# Patient Record
Sex: FEMALE | Race: WHITE | NOT HISPANIC OR LATINO | URBAN - METROPOLITAN AREA
[De-identification: names, ages, dates, MRNs, and addresses within clinical notes are randomized per-mention and may not be internally consistent; named-entity substitution may affect disease eponyms.]

---

## 2020-09-04 ENCOUNTER — APPOINTMENT (RX ONLY)
Dept: URBAN - METROPOLITAN AREA CLINIC 216 | Facility: CLINIC | Age: 50
Setting detail: DERMATOLOGY
End: 2020-09-04

## 2020-09-04 DIAGNOSIS — Z41.9 ENCOUNTER FOR PROCEDURE FOR PURPOSES OTHER THAN REMEDYING HEALTH STATE, UNSPECIFIED: ICD-10-CM

## 2020-09-04 PROCEDURE — ? BOTOX

## 2020-09-04 NOTE — PROCEDURE: BOTOX
Price (Use Numbers Only, No Special Characters Or $): 923 Price (Use Numbers Only, No Special Characters Or $): 845

## 2022-07-20 ENCOUNTER — APPOINTMENT (RX ONLY)
Dept: URBAN - METROPOLITAN AREA CLINIC 236 | Facility: CLINIC | Age: 52
Setting detail: DERMATOLOGY
End: 2022-07-20

## 2022-07-20 DIAGNOSIS — L72.8 OTHER FOLLICULAR CYSTS OF THE SKIN AND SUBCUTANEOUS TISSUE: ICD-10-CM | Status: INADEQUATELY CONTROLLED

## 2022-07-20 PROCEDURE — 99212 OFFICE O/P EST SF 10 MIN: CPT

## 2022-07-20 PROCEDURE — ? COUNSELING

## 2022-07-20 PROCEDURE — ? PRESCRIPTION MEDICATION MANAGEMENT

## 2022-07-20 PROCEDURE — ? PRESCRIPTION

## 2022-07-20 RX ORDER — DOXYCYCLINE HYCLATE 100 MG/1
CAPSULE, GELATIN COATED ORAL BID
Qty: 20 | Refills: 0 | Status: ERX | COMMUNITY
Start: 2022-07-20

## 2022-07-20 RX ADMIN — DOXYCYCLINE HYCLATE: 100 CAPSULE, GELATIN COATED ORAL at 00:00

## 2022-07-20 ASSESSMENT — LOCATION ZONE DERM: LOCATION ZONE: LEG

## 2022-07-20 ASSESSMENT — LOCATION SIMPLE DESCRIPTION DERM: LOCATION SIMPLE: LEFT THIGH

## 2022-07-20 ASSESSMENT — LOCATION DETAILED DESCRIPTION DERM: LOCATION DETAILED: LEFT ANTERIOR PROXIMAL THIGH

## 2022-07-20 NOTE — HPI: SKIN LESION
1/2 ppd x 53 years. attended smoking cessation class next month.  quit twice in the past and stopped July 18, 2017. Can walk dogs but hasn't started.   Continues chantix.  
Currently taking levothyroxine 75 mcg daily as directed on empty stomach with water and waits at least 30 minutes to consume other food, beverages, or medications.  Denies  temperature intolerance, or changes in bowel habits.  Last TSH was elevated at 4.02,    previously levels were 3.2, 3.9, 3.44        
How Severe Is Your Skin Lesion?: mild
May 2017 level 8, started weekly supplement. Will repeat labs in early August, reviewed need for labs every three months while taking high dose supplement.  Recent labs with level improved to 50, she also started calcium citrate which has small amount of vitamin D, maybe 500 iu.  She will decrease 50,000 iu to every other week and repeat labs, discussed change to daily 2000 iu after with monitoring for goal in 40's .    
Has Your Skin Lesion Been Treated?: not been treated
Is This A New Presentation, Or A Follow-Up?: Skin Lesion
Additional History: Has been using hydrogen peroxide to area

## 2022-07-20 NOTE — PROCEDURE: PRESCRIPTION MEDICATION MANAGEMENT
Render In Strict Bullet Format?: No
Initiate Treatment: Clindalotion 3% apply QD if more persist
Detail Level: Zone

## 2022-09-21 ENCOUNTER — APPOINTMENT (RX ONLY)
Dept: URBAN - METROPOLITAN AREA CLINIC 236 | Facility: CLINIC | Age: 52
Setting detail: DERMATOLOGY
End: 2022-09-21

## 2022-09-21 DIAGNOSIS — L57.0 ACTINIC KERATOSIS: ICD-10-CM

## 2022-09-21 PROCEDURE — 17000 DESTRUCT PREMALG LESION: CPT

## 2022-09-21 PROCEDURE — ? COUNSELING

## 2022-09-21 PROCEDURE — ? LIQUID NITROGEN

## 2022-09-21 ASSESSMENT — LOCATION ZONE DERM: LOCATION ZONE: NOSE

## 2022-09-21 ASSESSMENT — LOCATION DETAILED DESCRIPTION DERM: LOCATION DETAILED: NASAL TIP

## 2022-09-21 ASSESSMENT — LOCATION SIMPLE DESCRIPTION DERM: LOCATION SIMPLE: NOSE

## 2022-09-21 NOTE — HPI: SKIN LESION
What Type Of Note Output Would You Prefer (Optional)?: Bullet Format
Is This A New Presentation, Or A Follow-Up?: Skin Lesion
Additional History: Spot is grayish, can not cover it with make up. Would like face checked

## 2023-04-05 ENCOUNTER — APPOINTMENT (RX ONLY)
Dept: URBAN - METROPOLITAN AREA CLINIC 236 | Facility: CLINIC | Age: 53
Setting detail: DERMATOLOGY
End: 2023-04-05

## 2023-04-05 DIAGNOSIS — B07.8 OTHER VIRAL WARTS: ICD-10-CM | Status: INADEQUATELY CONTROLLED

## 2023-04-05 PROCEDURE — ? COUNSELING

## 2023-04-05 PROCEDURE — ? LIQUID NITROGEN

## 2023-04-05 PROCEDURE — 17110 DESTRUCTION B9 LES UP TO 14: CPT

## 2023-04-05 ASSESSMENT — LOCATION DETAILED DESCRIPTION DERM: LOCATION DETAILED: RIGHT RADIAL DORSAL HAND

## 2023-04-05 ASSESSMENT — LOCATION SIMPLE DESCRIPTION DERM: LOCATION SIMPLE: RIGHT HAND

## 2023-04-05 ASSESSMENT — LOCATION ZONE DERM: LOCATION ZONE: HAND

## 2023-04-05 NOTE — HPI: SKIN LESION
What Type Of Note Output Would You Prefer (Optional)?: Bullet Format
Has Your Skin Lesion Been Treated?: not been treated
Is This A New Presentation, Or A Follow-Up?: Skin Lesion
Additional History: Changed color last week
PAST SURGICAL HISTORY:  No significant past surgical history

## 2023-04-05 NOTE — PROCEDURE: LIQUID NITROGEN
Spray Paint Text: The liquid nitrogen was applied to the skin utilizing a spray paint frosting technique.
Post-Care Instructions: I reviewed with the patient in detail post-care instructions. Patient is to wear sunprotection, and avoid picking at any of the treated lesions. Pt may apply Vaseline to crusted or scabbing areas.
Medical Necessity Information: It is in your best interest to select a reason for this procedure from the list below. All of these items fulfill various CMS LCD requirements except the new and changing color options.
Show Applicator Variable?: Yes
Add 52 Modifier (Optional): no
Consent: The patient's consent was obtained including but not limited to risks of crusting, scabbing, blistering, scarring, darker or lighter pigmentary change, recurrence, incomplete removal and infection.
Medical Necessity Clause: This procedure was medically necessary because the lesions that were treated were:
Detail Level: Detailed

## 2023-11-08 ENCOUNTER — APPOINTMENT (RX ONLY)
Dept: URBAN - METROPOLITAN AREA CLINIC 236 | Facility: CLINIC | Age: 53
Setting detail: DERMATOLOGY
End: 2023-11-08

## 2023-11-08 DIAGNOSIS — L71.8 OTHER ROSACEA: ICD-10-CM

## 2023-11-08 DIAGNOSIS — L73.8 OTHER SPECIFIED FOLLICULAR DISORDERS: ICD-10-CM

## 2023-11-08 PROCEDURE — 99213 OFFICE O/P EST LOW 20 MIN: CPT

## 2023-11-08 PROCEDURE — ? ADDITIONAL NOTES

## 2023-11-08 PROCEDURE — ? PRESCRIPTION MEDICATION MANAGEMENT

## 2023-11-08 PROCEDURE — ? COUNSELING

## 2023-11-08 PROCEDURE — ? MDM - TREATMENT GOALS

## 2023-11-08 ASSESSMENT — LOCATION ZONE DERM: LOCATION ZONE: FACE

## 2023-11-08 ASSESSMENT — LOCATION SIMPLE DESCRIPTION DERM: LOCATION SIMPLE: LEFT FOREHEAD

## 2023-11-08 ASSESSMENT — LOCATION DETAILED DESCRIPTION DERM
LOCATION DETAILED: LEFT FOREHEAD
LOCATION DETAILED: LEFT INFERIOR MEDIAL FOREHEAD

## 2023-11-08 NOTE — PROCEDURE: ADDITIONAL NOTES
Detail Level: Zone
Render Risk Assessment In Note?: no
Additional Notes: Discussed cosmetic IPL laser for removal

## 2023-11-08 NOTE — PROCEDURE: PRESCRIPTION MEDICATION MANAGEMENT
Please drink plenty of fluids.  Please get plenty of rest.    Please return here or go to the Emergency Department for any concerns or worsening of condition.    It os ok to give children's motrin as directed on bottle for pain.    Rest, ice, compression and elevation to the affected joint or limb as needed.    Please follow up with your primary care doctor or specialist as needed.    If you  smoke, please stop smoking.    Reducing Knee Pain and Swelling    Many treatments can help reduce pain and swelling in your knee. Your healthcare provider or physical therapist may suggest one or more of the following treatments:  · Icing your knee helps reduce swelling. You may be asked to ice your knee once a day or more. Apply ice for about 15 to 20 minutes at a time, with at least 40 minutes between sessions. Always keep a towel between the ice and your skin.   · Keeping your leg raised above your heart helps excess fluid flow out of your knee joint. This reduces swelling.  · Compression means wrapping an elastic bandage or neoprene sleeve snugly around your knees. This keeps fluid from collecting in your knee joint.  · Electrical stimulation, done by a physical therapist or , can help reduce excess fluid in your knee joint.  · Anti-inflammatory medicines may be prescribed by your healthcare provider. You may take pills or receive injections in your knee.  · Isometric (jarrod) exercises strengthen the muscles that support your knee joint. They also help reduce excess fluid in your knee.  · Massage helps fluid drain away from your knee.  Date Last Reviewed: 10/13/2015  © 4486-4743 [employer]. [address] [place] [address], [place] [address]. All rights reserved. This information is not intended as a substitute for professional medical care. Always follow your healthcare professional's instructions.        
Detail Level: Zone
Initiate Treatment: IPL laser with [place]
Render In Strict Bullet Format?: No

## 2023-12-11 ENCOUNTER — APPOINTMENT (RX ONLY)
Dept: URBAN - METROPOLITAN AREA CLINIC 236 | Facility: CLINIC | Age: 53
Setting detail: DERMATOLOGY
End: 2023-12-11

## 2023-12-11 DIAGNOSIS — Z41.9 ENCOUNTER FOR PROCEDURE FOR PURPOSES OTHER THAN REMEDYING HEALTH STATE, UNSPECIFIED: ICD-10-CM

## 2023-12-11 PROCEDURE — ? COSMETIC CONSULTATION: PRODUCTS

## 2023-12-11 PROCEDURE — ? IPL COSMETIC

## 2023-12-11 PROCEDURE — ? ADDITIONAL NOTES

## 2023-12-11 ASSESSMENT — LOCATION SIMPLE DESCRIPTION DERM: LOCATION SIMPLE: LEFT CHEEK

## 2023-12-11 ASSESSMENT — LOCATION ZONE DERM: LOCATION ZONE: FACE

## 2023-12-11 ASSESSMENT — LOCATION DETAILED DESCRIPTION DERM: LOCATION DETAILED: LEFT CENTRAL MALAR CHEEK

## 2023-12-11 NOTE — PROCEDURE: IPL COSMETIC
Pre-Procedure Care: Prior to the procedure the patient and all present had protective eyewear in place and a warning sign was placed on the door.
Add Additional Location: No
Detail Level: Zone
Treatment Number: 1
End-Point And Post-Procedure Care: The procedure continued until mild purpura was noted.  Immediately following the procedure, applied Avene Day SPF. Post care reviewed with patient.
Ipl Type: elos
Eye Protection: opaque goggles
Consent: Prior to the procedure consent obtained, risks reviewed including but not limited to crusting, scabbing, blistering, scarring, darker or lighter pigmentary change, incidental hair removal, bruising, and/or incomplete removal.
Post-Care Instructions: I reviewed with the patient in detail post-care instructions. Patient should stay away from the sun and wear sun protection until treated areas are fully healed.\\nAvene SPF and Avene Cicalfate+ applied post treatment.
Price (Use Numbers Only, No Special Characters Or $): 766.52

## 2023-12-11 NOTE — PROCEDURE: ADDITIONAL NOTES
Detail Level: Simple
Additional Notes: Pt purchased [employer]. 3 face & chest IPL $899\\n1/3 today
Render Risk Assessment In Note?: no

## 2024-01-08 ENCOUNTER — APPOINTMENT (RX ONLY)
Dept: URBAN - METROPOLITAN AREA CLINIC 236 | Facility: CLINIC | Age: 54
Setting detail: DERMATOLOGY
End: 2024-01-08

## 2024-01-08 DIAGNOSIS — Z41.9 ENCOUNTER FOR PROCEDURE FOR PURPOSES OTHER THAN REMEDYING HEALTH STATE, UNSPECIFIED: ICD-10-CM

## 2024-01-08 PROCEDURE — ? IPL COSMETIC

## 2024-01-08 PROCEDURE — ? ADDITIONAL NOTES

## 2024-01-08 ASSESSMENT — LOCATION SIMPLE DESCRIPTION DERM
LOCATION SIMPLE: LEFT CHEEK
LOCATION SIMPLE: LEFT CHEEK

## 2024-01-08 ASSESSMENT — LOCATION ZONE DERM
LOCATION ZONE: FACE
LOCATION ZONE: FACE

## 2024-01-08 ASSESSMENT — LOCATION DETAILED DESCRIPTION DERM
LOCATION DETAILED: LEFT INFERIOR CENTRAL MALAR CHEEK
LOCATION DETAILED: LEFT CENTRAL MALAR CHEEK

## 2024-01-08 NOTE — PROCEDURE: IPL COSMETIC
Add Additional Location: No
Post-Care Instructions: I reviewed with the patient in detail post-care instructions. Patient should stay away from the sun and wear sun protection until treated areas are fully healed.\\nAvene SPF and Avene Cicalfate+ applied post treatment.
Ipl Type: elos
Pre-Procedure Care: Prior to the procedure the patient and all present had protective eyewear in place and a warning sign was placed on the door.
Eye Protection: opaque goggles
End-Point And Post-Procedure Care: The procedure continued until mild purpura was noted.  Immediately following the procedure, applied Cicalfate+ &Avene Day SPF. Post care reviewed with patient.
Detail Level: Zone
Price (Use Numbers Only, No Special Characters Or $): 863.28
Consent: Prior to the procedure consent obtained, risks reviewed including but not limited to crusting, scabbing, blistering, scarring, darker or lighter pigmentary change, incidental hair removal, bruising, and/or incomplete removal.
Treatment Number: 2

## 2024-01-24 ENCOUNTER — APPOINTMENT (RX ONLY)
Dept: URBAN - METROPOLITAN AREA CLINIC 236 | Facility: CLINIC | Age: 54
Setting detail: DERMATOLOGY
End: 2024-01-24

## 2024-01-24 DIAGNOSIS — Z41.9 ENCOUNTER FOR PROCEDURE FOR PURPOSES OTHER THAN REMEDYING HEALTH STATE, UNSPECIFIED: ICD-10-CM

## 2024-01-24 PROCEDURE — ? BOTOX

## 2024-01-24 NOTE — PROCEDURE: BOTOX
R Brow Units: 0
Show Additional Area 3: Yes
Consent: Written consent obtained. Risks include but not limited to lid/brow ptosis, bruising, swelling, diplopia, temporary effect, incomplete chemical denervation.
Show Ucl Units: No
Post-Care Instructions: Patient instructed to not lie down for 4 hours and limit physical activity for 24 hours.
Additional Area 1 Location: [facility]
Periorbital Skin Units: 4
Additional Area 4 Location: [facility]
Detail Level: Detailed
Lot #: [id number]
Forehead Units: 8
Dilution (U/0.1 Cc): 2.5
Additional Area 3 Location: platysma
Incrementing Botox Units: By 0.5 Units
Glabellar Complex Units: 22
Expiration Date (Month Year): 3/26
Additional Area 2 Location: obicularis oris
Additional Area 5 Location: [facility]
Price (Use Numbers Only, No Special Characters Or $): 44

## 2024-02-05 ENCOUNTER — APPOINTMENT (RX ONLY)
Dept: URBAN - METROPOLITAN AREA CLINIC 236 | Facility: CLINIC | Age: 54
Setting detail: DERMATOLOGY
End: 2024-02-05

## 2024-02-05 DIAGNOSIS — Z41.9 ENCOUNTER FOR PROCEDURE FOR PURPOSES OTHER THAN REMEDYING HEALTH STATE, UNSPECIFIED: ICD-10-CM

## 2024-02-05 PROCEDURE — ? IPL COSMETIC

## 2024-02-05 ASSESSMENT — LOCATION ZONE DERM: LOCATION ZONE: FACE

## 2024-02-05 ASSESSMENT — LOCATION DETAILED DESCRIPTION DERM: LOCATION DETAILED: LEFT CENTRAL MALAR CHEEK

## 2024-02-05 ASSESSMENT — LOCATION SIMPLE DESCRIPTION DERM: LOCATION SIMPLE: LEFT CHEEK

## 2024-02-05 NOTE — PROCEDURE: IPL COSMETIC
Consent: Prior to the procedure consent obtained, risks reviewed including but not limited to crusting, scabbing, blistering, scarring, darker or lighter pigmentary change, incidental hair removal, bruising, and/or incomplete removal.
Treatment Number: 3
Add Additional Location: No
Detail Level: Zone
Post-Care Instructions: I reviewed with the patient in detail post-care instructions. Patient should stay away from the sun and wear sun protection until treated areas are fully healed.
Number Of Passes: 2
Ipl Type: elos
Eye Protection: opaque goggles
Pre-Procedure Care: Prior to the procedure the patient and all present had protective eyewear in place and a warning sign was placed on the door.
End-Point And Post-Procedure Care: The procedure continued until mild purpura was noted.  Immediately following the procedure, applied Cicalfate+. Post care reviewed with patient.
Price (Use Numbers Only, No Special Characters Or $): 749.90

## 2024-05-01 ENCOUNTER — APPOINTMENT (RX ONLY)
Dept: URBAN - METROPOLITAN AREA CLINIC 236 | Facility: CLINIC | Age: 54
Setting detail: DERMATOLOGY
End: 2024-05-01

## 2024-05-01 DIAGNOSIS — L73.8 OTHER SPECIFIED FOLLICULAR DISORDERS: ICD-10-CM | Status: INADEQUATELY CONTROLLED

## 2024-05-01 DIAGNOSIS — L57.0 ACTINIC KERATOSIS: ICD-10-CM | Status: RESOLVED

## 2024-05-01 PROCEDURE — ? PRESCRIPTION

## 2024-05-01 PROCEDURE — ? COUNSELING

## 2024-05-01 PROCEDURE — 17110 DESTRUCTION B9 LES UP TO 14: CPT

## 2024-05-01 PROCEDURE — ? PRESCRIPTION MEDICATION MANAGEMENT

## 2024-05-01 PROCEDURE — ? ELECTRODESICCATION

## 2024-05-01 PROCEDURE — 99213 OFFICE O/P EST LOW 20 MIN: CPT | Mod: 25

## 2024-05-01 RX ORDER — TAZAROTENE 0.45 MG/G
LOTION TOPICAL
Qty: 45 | Refills: 3 | Status: ERX | COMMUNITY
Start: 2024-05-01

## 2024-05-01 RX ADMIN — TAZAROTENE: 0.45 LOTION TOPICAL at 00:00

## 2024-05-01 ASSESSMENT — LOCATION ZONE DERM
LOCATION ZONE: FACE
LOCATION ZONE: NOSE

## 2024-05-01 ASSESSMENT — LOCATION SIMPLE DESCRIPTION DERM
LOCATION SIMPLE: RIGHT EYEBROW
LOCATION SIMPLE: INFERIOR FOREHEAD
LOCATION SIMPLE: NOSE

## 2024-05-01 ASSESSMENT — LOCATION DETAILED DESCRIPTION DERM
LOCATION DETAILED: NASAL SUPRATIP
LOCATION DETAILED: RIGHT CENTRAL EYEBROW
LOCATION DETAILED: INFERIOR MID FOREHEAD

## 2024-05-01 NOTE — PROCEDURE: ELECTRODESICCATION
Consent: The patient's consent was obtained including but not limited to risks of crusting, scabbing, blistering, scarring, darker or lighter pigmentary change, recurrence, incomplete removal and infection.
Medical Necessity Clause: This procedure was medically necessary because the lesions that were treated were:
Detail Level: Simple
Post-Care Instructions: I reviewed with the patient in detail post-care instructions. Patient is to wear sunprotection, and avoid picking at any of the treated lesions. Pt may apply Vaseline to crusted or scabbing areas
Anesthesia Type: 1% lidocaine with epinephrine
Medical Necessity Information: It is in your best interest to select a reason for this procedure from the list below. All of these items fulfill various CMS LCD requirements except the new and changing color options.
Include Z78.9 (Other Specified Conditions Influencing Health Status) As An Associated Diagnosis?: No

## 2024-05-01 NOTE — PROCEDURE: PRESCRIPTION MEDICATION MANAGEMENT
Detail Level: Zone
Render In Strict Bullet Format?: No
Initiate Treatment: Arazlo qhs every other night